# Patient Record
Sex: MALE | Race: WHITE | NOT HISPANIC OR LATINO | Employment: FULL TIME | URBAN - METROPOLITAN AREA
[De-identification: names, ages, dates, MRNs, and addresses within clinical notes are randomized per-mention and may not be internally consistent; named-entity substitution may affect disease eponyms.]

---

## 2021-03-22 ENCOUNTER — OFFICE VISIT CONVERTED (OUTPATIENT)
Dept: SURGERY | Facility: CLINIC | Age: 46
End: 2021-03-22
Attending: NURSE PRACTITIONER

## 2021-04-02 ENCOUNTER — HOSPITAL ENCOUNTER (OUTPATIENT)
Dept: VACCINE CLINIC | Facility: HOSPITAL | Age: 46
Discharge: HOME | End: 2021-04-02
Attending: INTERNAL MEDICINE

## 2021-04-21 ENCOUNTER — HOSPITAL ENCOUNTER (OUTPATIENT)
Dept: GASTROENTEROLOGY | Facility: HOSPITAL | Age: 46
Setting detail: HOSPITAL OUTPATIENT SURGERY
Discharge: HOME | End: 2021-04-21
Attending: SURGERY

## 2021-04-21 LAB — GLUCOSE BLD-MCNC: 109 MG/DL (ref 70–99)

## 2021-04-26 ENCOUNTER — HOSPITAL ENCOUNTER (OUTPATIENT)
Dept: VACCINE CLINIC | Facility: HOSPITAL | Age: 46
Discharge: HOME | End: 2021-04-26
Attending: INTERNAL MEDICINE

## 2021-05-14 VITALS — HEIGHT: 74 IN | WEIGHT: 315 LBS | BODY MASS INDEX: 40.43 KG/M2 | RESPIRATION RATE: 18 BRPM

## 2021-12-15 ENCOUNTER — TELEPHONE (OUTPATIENT)
Dept: URGENT CARE | Facility: CLINIC | Age: 46
End: 2021-12-15

## 2021-12-15 PROCEDURE — U0004 COV-19 TEST NON-CDC HGH THRU: HCPCS | Performed by: FAMILY MEDICINE

## 2024-09-27 ENCOUNTER — OFFICE VISIT (OUTPATIENT)
Dept: ORTHOPEDIC SURGERY | Facility: CLINIC | Age: 49
End: 2024-09-27
Payer: COMMERCIAL

## 2024-09-27 VITALS
OXYGEN SATURATION: 95 % | BODY MASS INDEX: 40.43 KG/M2 | HEIGHT: 74 IN | HEART RATE: 63 BPM | DIASTOLIC BLOOD PRESSURE: 98 MMHG | SYSTOLIC BLOOD PRESSURE: 155 MMHG | WEIGHT: 315 LBS

## 2024-09-27 DIAGNOSIS — R20.2 NUMBNESS AND TINGLING IN RIGHT HAND: ICD-10-CM

## 2024-09-27 DIAGNOSIS — R20.0 NUMBNESS AND TINGLING IN RIGHT HAND: ICD-10-CM

## 2024-09-27 DIAGNOSIS — M25.511 RIGHT SHOULDER PAIN, UNSPECIFIED CHRONICITY: Primary | ICD-10-CM

## 2024-09-27 DIAGNOSIS — M25.521 RIGHT ELBOW PAIN: ICD-10-CM

## 2024-09-27 RX ORDER — DICLOFENAC SODIUM 75 MG/1
75 TABLET, DELAYED RELEASE ORAL 2 TIMES DAILY
Qty: 60 TABLET | Refills: 2 | Status: SHIPPED | OUTPATIENT
Start: 2024-09-27

## 2024-10-10 ENCOUNTER — TREATMENT (OUTPATIENT)
Dept: PHYSICAL THERAPY | Facility: CLINIC | Age: 49
End: 2024-10-10
Payer: COMMERCIAL

## 2024-10-10 DIAGNOSIS — M25.521 RIGHT ELBOW PAIN: ICD-10-CM

## 2024-10-10 DIAGNOSIS — R20.2 NUMBNESS AND TINGLING IN RIGHT HAND: ICD-10-CM

## 2024-10-10 DIAGNOSIS — R20.0 NUMBNESS AND TINGLING IN RIGHT HAND: ICD-10-CM

## 2024-10-10 DIAGNOSIS — M25.511 ACUTE PAIN OF RIGHT SHOULDER: Primary | ICD-10-CM

## 2024-10-18 ENCOUNTER — TREATMENT (OUTPATIENT)
Dept: PHYSICAL THERAPY | Facility: CLINIC | Age: 49
End: 2024-10-18
Payer: COMMERCIAL

## 2024-10-18 DIAGNOSIS — R20.2 NUMBNESS AND TINGLING IN RIGHT HAND: ICD-10-CM

## 2024-10-18 DIAGNOSIS — M25.521 RIGHT ELBOW PAIN: ICD-10-CM

## 2024-10-18 DIAGNOSIS — R20.0 NUMBNESS AND TINGLING IN RIGHT HAND: ICD-10-CM

## 2024-10-18 DIAGNOSIS — M25.511 ACUTE PAIN OF RIGHT SHOULDER: Primary | ICD-10-CM

## 2024-10-22 ENCOUNTER — TREATMENT (OUTPATIENT)
Dept: PHYSICAL THERAPY | Facility: CLINIC | Age: 49
End: 2024-10-22
Payer: COMMERCIAL

## 2024-10-22 DIAGNOSIS — R20.0 NUMBNESS AND TINGLING IN RIGHT HAND: ICD-10-CM

## 2024-10-22 DIAGNOSIS — M25.521 RIGHT ELBOW PAIN: ICD-10-CM

## 2024-10-22 DIAGNOSIS — M25.511 ACUTE PAIN OF RIGHT SHOULDER: Primary | ICD-10-CM

## 2024-10-22 DIAGNOSIS — R20.2 NUMBNESS AND TINGLING IN RIGHT HAND: ICD-10-CM

## 2024-10-22 PROCEDURE — 97112 NEUROMUSCULAR REEDUCATION: CPT | Performed by: PHYSICAL THERAPIST

## 2024-10-22 PROCEDURE — 97110 THERAPEUTIC EXERCISES: CPT | Performed by: PHYSICAL THERAPIST

## 2024-10-22 PROCEDURE — 97530 THERAPEUTIC ACTIVITIES: CPT | Performed by: PHYSICAL THERAPIST

## 2024-10-29 ENCOUNTER — TREATMENT (OUTPATIENT)
Dept: PHYSICAL THERAPY | Facility: CLINIC | Age: 49
End: 2024-10-29
Payer: COMMERCIAL

## 2024-10-29 DIAGNOSIS — R20.2 NUMBNESS AND TINGLING IN RIGHT HAND: ICD-10-CM

## 2024-10-29 DIAGNOSIS — M25.511 ACUTE PAIN OF RIGHT SHOULDER: Primary | ICD-10-CM

## 2024-10-29 DIAGNOSIS — M25.521 RIGHT ELBOW PAIN: ICD-10-CM

## 2024-10-29 DIAGNOSIS — R20.0 NUMBNESS AND TINGLING IN RIGHT HAND: ICD-10-CM

## 2024-10-29 NOTE — PROGRESS NOTES
Occupational Therapy Daily Treatment Note  [address] [place] [address]      Patient: Cade Patel   : 1975  Referring practitioner: Lazaro Montez MD  Date of Initial Visit: Type: THERAPY  Noted: 10/10/2024  Today's Date: 10/29/2024  Patient seen for 4 sessions         Subjective Evaluation    Pain  Current pain ratin  Location: R arm       Cade Patel reports: it has been hurting little more, but I have been more active the last 2 days. Pins and needles are still there, but now getting pin prick feelings that are painful. I did get a call and have MRI scheduled for .   He reports the corner stretch causing too much pain to perform at home.  Objective          Tenderness     Right Shoulder  Tenderness in the infraspinatus tendon. No tenderness in the AC joint, acromion, biceps tendon (proximal), clavicle, medial scapula and SC joint.     Right Elbow   Tenderness in the medial epicondyle and olecranon process. No tenderness in the cubital tunnel and lateral epicondyle.     Right Wrist/Hand   Tenderness in the medial epicondyle and olecranon process. No tenderness in the lateral epicondyle.     Additional Tenderness Details  Tenderness over thenar eminence    Neurological Testing     Sensation     Wrist/Hand     Right   Diminished: light touch    Comments   Right light touch: 3.61 thumb and index    Additional Neurological Details  Tingling in thumb, index and middle fingers    Active Range of Motion     Right Shoulder   Flexion: WFL  Abduction: 120 degrees     Right Elbow   Normal active range of motion    Right Wrist   Normal active range of motion    Additional Active Range of Motion Details  Full composite fist and thumb opposition    Strength/Myotome Testing     Right Elbow   Flexion: 5  Extension: 4+    Left Wrist/Hand      (2nd hand position)     Trial 1: 85 lbs    Trial 2: 75 lbs    Trial 3: 67 lbs    Average: 75.67 lbs    Right Wrist/Hand   Wrist extension:  5  Wrist flexion: 5     (2nd hand position)     Trial 1: 32 lbs    Trial 2: 30 lbs    Trial 3: 45 lbs    Average: 35.67 lbs    Tests     Right Shoulder   Positive empty can, full can and Hawkin's.   Negative crossover.     Right Elbow   Positive Cozen's.   Negative Tinel's sign (cubital tunnel).     Right Wrist/Hand   Negative CMC grind, Tinel's sign (medial nerve) and Tinel's sign (radial tunnel).       See Exercise, Manual, and Modality Logs for complete treatment.   Pt reports increased numbness into first 4 digits while performing the UBE by the end of 5 minutes.     Assessment/Plan    Visit Diagnoses:    ICD-10-CM ICD-9-CM   1. Acute pain of right shoulder  M25.511 719.41   2. Right elbow pain  M25.521 719.42   3. Numbness and tingling in right hand  R20.0 782.0    R20.2        Pt gained shoulder abduction today, but continues with tingling and pain. Continue per POC         Timed:  Manual Therapy:    0     mins  54185;  Therapeutic Exercise:    10     mins  44793;     Therapeutic Activity:    10     mins  47100;  Ultrasound:     0     mins  61222;    Electrical Stimulation:    0     mins 86032;  Neuromuscular Cynthia:    10    mins  99800;      Timed Treatment:   30   mins   Total Treatment:     30   min    JAZ Apodaca/L  Occupational Therapist    Electronically signed   License number [id number]

## 2024-11-01 ENCOUNTER — TREATMENT (OUTPATIENT)
Dept: PHYSICAL THERAPY | Facility: CLINIC | Age: 49
End: 2024-11-01
Payer: COMMERCIAL

## 2024-11-01 DIAGNOSIS — M25.521 RIGHT ELBOW PAIN: ICD-10-CM

## 2024-11-01 DIAGNOSIS — R20.0 NUMBNESS AND TINGLING IN RIGHT HAND: ICD-10-CM

## 2024-11-01 DIAGNOSIS — M25.511 ACUTE PAIN OF RIGHT SHOULDER: Primary | ICD-10-CM

## 2024-11-01 DIAGNOSIS — R20.2 NUMBNESS AND TINGLING IN RIGHT HAND: ICD-10-CM

## 2024-11-01 NOTE — PROGRESS NOTES
Occupational Therapy Daily Treatment Note  [address] [place] [address]      Patient: Cade Patel   : 1975  Referring practitioner: Lazaro Montez MD  Date of Initial Visit: Type: THERAPY  Noted: 10/10/2024  Today's Date: 2024  Patient seen for 5 sessions         Subjective   Cade Patel reports: my shoulder is doing good, just still having the numbness and tingling in the hand. I am curios about the MRI on Monday.    Objective   See Exercise, Manual, and Modality Logs for complete treatment.   Pt tolerated desensitization well with vibration ball that increased sensation to perform fine motor coordination.    Assessment/Plan    Visit Diagnoses:    ICD-10-CM ICD-9-CM   1. Acute pain of right shoulder  M25.511 719.41   2. Right elbow pain  M25.521 719.42   3. Numbness and tingling in right hand  R20.0 782.0    R20.2        Continue per POC         Timed:  Manual Therapy:    0     mins  08660;  Therapeutic Exercise:    10     mins  97279;     Therapeutic Activity:    10     mins  91516;  Ultrasound:     0     mins  52203;    Electrical Stimulation:    0     mins 54567;  Neuromuscular Cynthia:    10    mins  69247;      Timed Treatment:   30   mins   Total Treatment:     30   min    JAZ Apodaca/L  Occupational Therapist    Electronically signed   License number [id number]

## 2024-11-05 ENCOUNTER — TREATMENT (OUTPATIENT)
Dept: PHYSICAL THERAPY | Facility: CLINIC | Age: 49
End: 2024-11-05
Payer: COMMERCIAL

## 2024-11-05 DIAGNOSIS — R20.2 NUMBNESS AND TINGLING IN RIGHT HAND: ICD-10-CM

## 2024-11-05 DIAGNOSIS — M25.511 ACUTE PAIN OF RIGHT SHOULDER: Primary | ICD-10-CM

## 2024-11-05 DIAGNOSIS — M25.521 RIGHT ELBOW PAIN: ICD-10-CM

## 2024-11-05 DIAGNOSIS — R20.0 NUMBNESS AND TINGLING IN RIGHT HAND: ICD-10-CM

## 2024-11-05 NOTE — PROGRESS NOTES
Occupational Therapy Daily Treatment Note  [address] [place] [address]      Patient: Cade Patel   : 1975  Referring practitioner: Lazaro Montez MD  Date of Initial Visit: Type: THERAPY  Noted: 10/10/2024  Today's Date: 2024  Patient seen for 6 sessions         Subjective   Cade Jorge reports: I had my MRI and results show some stenosis and DDD.    Objective   See Exercise, Manual, and Modality Logs for complete treatment.     Full median nerve glide added today with good tolerance. Chin tucks also added today and HEP reviewed. Pt had difficulty performing palm to tip translation today.   Assessment/Plan    Visit Diagnoses:    ICD-10-CM ICD-9-CM   1. Acute pain of right shoulder  M25.511 719.41   2. Right elbow pain  M25.521 719.42   3. Numbness and tingling in right hand  R20.0 782.0    R20.2        Continue per POC         Timed:  Manual Therapy:    0     mins  39146;  Therapeutic Exercise:    10     mins  88189;     Therapeutic Activity:    10     mins  23945;  Ultrasound:     0     mins  88318;    Electrical Stimulation:    0     mins 03142;  Neuromuscular Cynthia:    10    mins  90945;      Timed Treatment:   30   mins   Total Treatment:     30   min    Rashida Patino OTR/L  Occupational Therapist    Electronically signed   License number [id number]

## 2024-11-07 ENCOUNTER — TREATMENT (OUTPATIENT)
Dept: PHYSICAL THERAPY | Facility: CLINIC | Age: 49
End: 2024-11-07
Payer: COMMERCIAL

## 2024-11-07 ENCOUNTER — OFFICE VISIT (OUTPATIENT)
Dept: ORTHOPEDIC SURGERY | Facility: CLINIC | Age: 49
End: 2024-11-07
Payer: COMMERCIAL

## 2024-11-07 VITALS
SYSTOLIC BLOOD PRESSURE: 165 MMHG | DIASTOLIC BLOOD PRESSURE: 102 MMHG | HEIGHT: 74 IN | OXYGEN SATURATION: 95 % | WEIGHT: 315 LBS | BODY MASS INDEX: 40.43 KG/M2 | HEART RATE: 87 BPM

## 2024-11-07 DIAGNOSIS — M25.521 RIGHT ELBOW PAIN: Primary | ICD-10-CM

## 2024-11-07 DIAGNOSIS — R20.0 NUMBNESS AND TINGLING IN RIGHT HAND: ICD-10-CM

## 2024-11-07 DIAGNOSIS — M25.511 RIGHT SHOULDER PAIN, UNSPECIFIED CHRONICITY: Primary | ICD-10-CM

## 2024-11-07 DIAGNOSIS — M54.12 CERVICAL RADICULOPATHY: ICD-10-CM

## 2024-11-07 DIAGNOSIS — M25.521 RIGHT ELBOW PAIN: ICD-10-CM

## 2024-11-07 DIAGNOSIS — R20.2 NUMBNESS AND TINGLING IN RIGHT HAND: ICD-10-CM

## 2024-11-07 NOTE — PROGRESS NOTES
Chief Complaint  Follow-up of the Right Shoulder    Subjective          History of Present Illness      Cade Patel is a 49 y.o. male  presents to [facility] for     Patient presents for follow-up evaluation of right shoulder pain and right hand numbness and tingling.  He saw Dr. Montez for initial evaluation on 9/27/2024 he was referred to us by his primary care physician.  He states the shoulder pain that he had has been helped by doing physical therapy he states that his main concern at this point is numbness and tingling into his right hand he states the second third and fourth fingers go numb he states it is constant.  He also states he has neck pain he had concern for this his primary care physician ordered MRI of his cervical spine he has this result and has a referral to go to [facility] in [place] on Monday.  Patient states he will follow-up with the neurosurgeon regarding his neck and he feels he is in a good place for his shoulder at this time.      Allergies   Allergen Reactions    Aspirin Nausea Only        Social History     Socioeconomic History    Marital status:    Tobacco Use    Smoking status: Never    Smokeless tobacco: Never   Vaping Use    Vaping status: Never Used   Substance and Sexual Activity    Alcohol use: Never    Drug use: Never    Sexual activity: Defer        REVIEW OF SYSTEMS    Constitutional: Awake alert and oriented x3, no acute distress, denies fevers, chills, weight loss  Respiratory: No respiratory distress  Vascular: Brisk cap refill, Intact distal pulses, No cyanosis, compartments soft with no signs or symptoms of compartment syndrome or DVT.   Cardiovascular: Denies chest pain, shortness of breath  Skin: Denies rashes, acute skin changes  Neurologic: Denies headache, loss of consciousness  MSK: Right shoulder pain, right hand numbness and tingling      Objective   Vital Signs:   BP (!) 165/102 Comment: PROVIDER AWARE, PT  "ADVISED TO SEE PCP. PT STATES JUST FINISHED PHYSICAL THERAPY  Pulse 87   Ht 188 cm (74.02\")   Wt (!) 154 kg (340 lb)   SpO2 95%   BMI 43.63 kg/m²     Body mass index is 43.63 kg/m².    Physical Exam       Right upper extremity: sensation intact to the medial, radial and ulnar nerve, intact supination  and pronation, -5 degrees right elbow, flexion to 155 degrees, tender to the anterior  and lateral elbow, forward elevation  to 160 degrees, abduction to 140 degrees, external rotation  to 80 degrees, internal rotation to 70 degrees, 4 plus supraspinatus strength, 5/5 infraspinatus and subscap, positive  impingement, positive  crepitus with rotation, negative  cross arm, neurovascularly intact           Procedures    Imaging Results (Most Recent)       None             Result Review :   The following data was reviewed by: UMA Porter on 11/07/2024:               Assessment and Plan    Diagnoses and all orders for this visit:    1. Right shoulder pain, unspecified chronicity (Primary)    2. Right elbow pain    3. Numbness and tingling in right hand    4. Cervical radiculopathy        Discussed diagnosis and treatment options with the patient he was advised to continue home exercise for the shoulder he may call back at any time to do more exercises with physical therapy guidance for the shoulder for now he will follow-up with neurosurgery with [facility] to evaluate his cervical spine MRI and see treatment for those issues follow-up as needed with our office    Call or return if worsening symptoms.    Follow Up   Return if symptoms worsen or fail to improve.  Patient was given instructions and counseling regarding his condition or for health maintenance advice. Please see specific information pulled into the AVS if appropriate.       EMR Dragon/Transcription disclaimer:  Part of this note may be an electronic transcription/translation of spoken language to printed text using the Dragon Dictation " System

## 2024-11-07 NOTE — PROGRESS NOTES
Occupational Therapy Daily Treatment Note  [address] [place] [address]      Patient: Cade Patel   : 1975  Referring practitioner: Lazaro Montez MD  Date of Initial Visit: Type: THERAPY  Noted: 10/10/2024  Today's Date: 2024  Patient seen for 7 sessions         Subjective   Cade Goffdora reports: I have an appointment with spine doctor Monday. The numbness is not getting any better. He reports decreased coordination when reaching his middle finger to thumb tip.    Objective          Tenderness     Right Shoulder  Tenderness in the infraspinatus tendon. No tenderness in the AC joint, acromion, biceps tendon (proximal), clavicle, medial scapula and SC joint.     Right Elbow   No tenderness in the cubital tunnel, lateral epicondyle, medial epicondyle and olecranon process.     Right Wrist/Hand   No tenderness in the lateral epicondyle, medial epicondyle and olecranon process.     Neurological Testing     Sensation     Wrist/Hand     Right   Diminished: light touch    Comments   Right light touch: 3.61 thumb and index    Additional Neurological Details  Tingling in thumb, index and middle fingers    Active Range of Motion     Right Shoulder   Flexion: WFL  Abduction: WFL    Right Elbow   Normal active range of motion    Right Wrist   Normal active range of motion    Additional Active Range of Motion Details  Full composite fist and thumb opposition    Strength/Myotome Testing     Right Elbow   Flexion: 5  Extension: 4+    Left Wrist/Hand      (2nd hand position)     Trial 1: 85 lbs    Trial 2: 75 lbs    Trial 3: 67 lbs    Average: 75.67 lbs    Right Wrist/Hand   Wrist extension: 5  Wrist flexion: 5     (2nd hand position)     Trial 1: 45 lbs    Trial 2: 39 lbs    Trial 3: 35 lbs    Average: 39.67 lbs    Tests     Right Shoulder   Positive crossover and Hawkin's.   Negative empty can and full can.     Right Elbow   Positive Cozen's.   Negative Tinel's sign (cubital tunnel).      Right Wrist/Hand   Negative CMC grind, Tinel's sign (medial nerve) and Tinel's sign (radial tunnel).       See Exercise, Manual, and Modality Logs for complete treatment.       Assessment/Plan    Visit Diagnoses:    ICD-10-CM ICD-9-CM   1. Right elbow pain  M25.521 719.42   2. Numbness and tingling in right hand  R20.0 782.0    R20.2        Pt to return to physician and recommend discharge from Occupational Therapy.         Timed:  Manual Therapy:    0     mins  58107;  Therapeutic Exercise:    10     mins  12385;     Therapeutic Activity:    10     mins  91036;  Ultrasound:     0     mins  31215;    Electrical Stimulation:    0     mins 13978;  Neuromuscular Cynthia:    10    mins  35572;      Timed Treatment:   30   mins   Total Treatment:     30   min    JAZ Apodaca/L  Occupational Therapist    Electronically signed   License number [id number]

## 2024-11-25 ENCOUNTER — DOCUMENTATION (OUTPATIENT)
Dept: PHYSICAL THERAPY | Facility: CLINIC | Age: 49
End: 2024-11-25
Payer: COMMERCIAL

## 2024-11-25 NOTE — PROGRESS NOTES
Discharge Summary  Discharge Summary from Occupational Therapy Report    Patient Information  Cade Patel  1975    Dates OT visit: 10/10/24-11/7/24  Number of Visits: 7     Discharge Status of Patient:   Cade Patel reports: I have an appointment with spine doctor Monday. The numbness is not getting any better. He reports decreased coordination when reaching his middle finger to thumb tip.     Objective            Tenderness      Right Shoulder  Tenderness in the infraspinatus tendon. No tenderness in the AC joint, acromion, biceps tendon (proximal), clavicle, medial scapula and SC joint.      Right Elbow   No tenderness in the cubital tunnel, lateral epicondyle, medial epicondyle and olecranon process.      Right Wrist/Hand   No tenderness in the lateral epicondyle, medial epicondyle and olecranon process.      Neurological Testing      Sensation      Wrist/Hand      Right   Diminished: light touch     Comments   Right light touch: 3.61 thumb and index     Additional Neurological Details  Tingling in thumb, index and middle fingers     Active Range of Motion      Right Shoulder   Flexion: WFL  Abduction: WFL     Right Elbow   Normal active range of motion     Right Wrist   Normal active range of motion     Additional Active Range of Motion Details  Full composite fist and thumb opposition     Strength/Myotome Testing      Right Elbow   Flexion: 5  Extension: 4+     Left Wrist/Hand       (2nd hand position)     Trial 1: 85 lbs    Trial 2: 75 lbs    Trial 3: 67 lbs    Average: 75.67 lbs     Right Wrist/Hand   Wrist extension: 5  Wrist flexion: 5      (2nd hand position)     Trial 1: 45 lbs    Trial 2: 39 lbs    Trial 3: 35 lbs    Average: 39.67 lbs     Tests      Right Shoulder   Positive crossover and Hawkin's.   Negative empty can and full can.      Right Elbow   Positive Cozen's.   Negative Tinel's sign (cubital tunnel).      Right Wrist/Hand   Negative CMC grind, Tinel's sign (medial nerve)  and Tinel's sign (radial tunnel).      Goals: Partially Met    Visit Diagnoses:  No diagnosis found.    Discharge Plan: Patient to return to referring/providing physician    Comments Pt shoulder is much better, but continues with numbness in his R hand. Pt had MRI and has referral to neurosurgeon.    Date of Discharge 11/7/24        Rashida Patino OTR/L  Occupational Therapist  License number [id number]